# Patient Record
Sex: FEMALE | Race: WHITE | NOT HISPANIC OR LATINO | URBAN - METROPOLITAN AREA
[De-identification: names, ages, dates, MRNs, and addresses within clinical notes are randomized per-mention and may not be internally consistent; named-entity substitution may affect disease eponyms.]

---

## 2018-06-12 NOTE — PATIENT DISCUSSION
Pt had a change in the right eye. We printed out the new Rx and they Pt will have the right eye prescription remade.   DEANGELO

## 2018-11-02 ENCOUNTER — OUTPATIENT (OUTPATIENT)
Dept: OUTPATIENT SERVICES | Facility: HOSPITAL | Age: 63
LOS: 1 days | End: 2018-11-02
Payer: COMMERCIAL

## 2018-11-02 PROCEDURE — 76700 US EXAM ABDOM COMPLETE: CPT | Mod: 26

## 2018-11-02 PROCEDURE — 78226 HEPATOBILIARY SYSTEM IMAGING: CPT | Mod: 26

## 2018-12-11 ENCOUNTER — OUTPATIENT (OUTPATIENT)
Dept: OUTPATIENT SERVICES | Facility: HOSPITAL | Age: 63
LOS: 1 days | End: 2018-12-11

## 2018-12-27 ENCOUNTER — OUTPATIENT (OUTPATIENT)
Dept: OUTPATIENT SERVICES | Facility: HOSPITAL | Age: 63
LOS: 1 days | End: 2018-12-27

## 2019-04-17 PROBLEM — Z00.00 ENCOUNTER FOR PREVENTIVE HEALTH EXAMINATION: Status: ACTIVE | Noted: 2019-04-17

## 2020-01-08 NOTE — PATIENT DISCUSSION
ADVISED PT STOP OTC VISINE COMPLETELY. AND BEGIN LUMIFY. ADVISED HER IT WOULD TAKE ABOUT A MONTH TO SEE CHANGES.

## 2020-06-22 ENCOUNTER — APPOINTMENT (OUTPATIENT)
Dept: MAMMOGRAPHY | Facility: CLINIC | Age: 65
End: 2020-06-22
Payer: MEDICARE

## 2020-06-22 PROCEDURE — 77067 SCR MAMMO BI INCL CAD: CPT

## 2020-06-22 PROCEDURE — 77063 BREAST TOMOSYNTHESIS BI: CPT

## 2020-07-10 ENCOUNTER — APPOINTMENT (OUTPATIENT)
Dept: POPULATION HEALTH | Facility: CLINIC | Age: 65
End: 2020-07-10
Payer: MEDICARE

## 2020-07-10 DIAGNOSIS — M32.9 SYSTEMIC LUPUS ERYTHEMATOSUS, UNSPECIFIED: ICD-10-CM

## 2020-07-10 DIAGNOSIS — M06.9 RHEUMATOID ARTHRITIS, UNSPECIFIED: ICD-10-CM

## 2020-07-10 DIAGNOSIS — E78.5 HYPERLIPIDEMIA, UNSPECIFIED: ICD-10-CM

## 2020-07-10 DIAGNOSIS — Z87.891 PERSONAL HISTORY OF NICOTINE DEPENDENCE: ICD-10-CM

## 2020-07-10 DIAGNOSIS — Z77.098 CONTACT WITH AND (SUSPECTED) EXPOSURE TO OTHER HAZARDOUS, CHIEFLY NONMEDICINAL, CHEMICALS: ICD-10-CM

## 2020-07-10 DIAGNOSIS — Z84.0 FAMILY HISTORY OF DISEASES OF THE SKIN AND SUBCUTANEOUS TISSUE: ICD-10-CM

## 2020-07-10 DIAGNOSIS — E03.9 HYPOTHYROIDISM, UNSPECIFIED: ICD-10-CM

## 2020-07-10 DIAGNOSIS — Z80.9 FAMILY HISTORY OF MALIGNANT NEOPLASM, UNSPECIFIED: ICD-10-CM

## 2020-07-10 DIAGNOSIS — Z80.51 FAMILY HISTORY OF MALIGNANT NEOPLASM OF KIDNEY: ICD-10-CM

## 2020-07-10 DIAGNOSIS — L40.50 ARTHROPATHIC PSORIASIS, UNSPECIFIED: ICD-10-CM

## 2020-07-10 PROCEDURE — 99204 OFFICE O/P NEW MOD 45 MIN: CPT | Mod: 95

## 2020-07-16 PROBLEM — Z77.098 EXPOSURE TO CHEMICAL POLLUTION: Status: ACTIVE | Noted: 2020-07-16

## 2020-07-16 PROBLEM — M06.9 RHEUMATOID ARTHRITIS: Status: ACTIVE | Noted: 2020-07-16

## 2020-07-16 PROBLEM — Z84.0 FAMILY HISTORY OF PSORIASIS: Status: ACTIVE | Noted: 2020-07-16

## 2020-07-16 PROBLEM — E78.5 HLD (HYPERLIPIDEMIA): Status: ACTIVE | Noted: 2020-07-16

## 2020-07-16 PROBLEM — Z80.51 FAMILY HISTORY OF MALIGNANT NEOPLASM OF KIDNEY: Status: ACTIVE | Noted: 2020-07-16

## 2020-07-16 PROBLEM — L40.50 PSORIATIC ARTHRITIS: Status: ACTIVE | Noted: 2020-07-16

## 2020-07-16 PROBLEM — Z87.891 FORMER SMOKER: Status: ACTIVE | Noted: 2020-07-16

## 2020-07-16 PROBLEM — Z80.9 FAMILY HISTORY OF MALIGNANT NEOPLASM: Status: ACTIVE | Noted: 2020-07-16

## 2020-07-16 PROBLEM — E03.9 HYPOTHYROIDISM, UNSPECIFIED TYPE: Status: ACTIVE | Noted: 2020-07-16

## 2020-07-16 PROBLEM — M32.9 SYSTEMIC LUPUS ERYTHEMATOSUS (SLE) IN ADULT: Status: ACTIVE | Noted: 2020-07-16

## 2020-07-16 RX ORDER — ATORVASTATIN CALCIUM 20 MG/1
20 TABLET, FILM COATED ORAL
Refills: 0 | Status: ACTIVE | COMMUNITY

## 2020-07-16 RX ORDER — LEVOTHYROXINE SODIUM 112 MCG
112 TABLET ORAL
Refills: 0 | Status: ACTIVE | COMMUNITY

## 2020-07-16 RX ORDER — HYDROXYCHLOROQUINE SULFATE 200 MG/1
200 TABLET, FILM COATED ORAL
Refills: 0 | Status: ACTIVE | COMMUNITY

## 2020-07-16 RX ORDER — LISINOPRIL 20 MG/1
20 TABLET ORAL
Refills: 0 | Status: ACTIVE | COMMUNITY

## 2020-07-16 RX ORDER — ACETAMINOPHEN 325 MG/1
TABLET, FILM COATED ORAL
Refills: 0 | Status: ACTIVE | COMMUNITY

## 2020-07-16 RX ORDER — CHROMIUM 200 MCG
TABLET ORAL
Refills: 0 | Status: ACTIVE | COMMUNITY

## 2020-07-16 RX ORDER — UBIDECARENONE 100 MG
100 CAPSULE ORAL
Refills: 0 | Status: ACTIVE | COMMUNITY

## 2020-07-16 NOTE — HISTORY OF PRESENT ILLNESS
[FreeTextEntry1] : 65 year old woman here for evaluation of exposure to PFAS chemicals in [facility],.\par \par born in [facility] but moved as an infant to [place], [place] until age 12.  then moved back to [facility] from age 12 until about 31 ().  In  she moved to [place] where she rented a house for 1 year and then moved to a different house in [place] in the same development where she lived for 9 more years.  [place]  Water in the house was supplied by municipal supply.  Drank, bathed, and cooked with the tap water.  \par \par In early 40s she left [place] and moved back to [place] and rented a mobile home, around , where she has lived until the present time. 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 switched company several years later to a different title company in [place], where she still works.\par \par Hobbies:\par bowling\par camping\par beach\par \par PMHx:\par psoriatic arthritis, childhood psoriasis started at around 12\par Diagnosed with rheumatoid arthritis in .  had been on enbrel but got osteomyelitis so stopped. has tried other biologics but had adverse effects.\par SLE diagnosed in .\par hypothyroidism, doesn’t remember when diagnosed.\par HLD \par HTN\par dermatomyositis in 2017.  \par \par \par FHx:\par father's side family members - psoriasis.   in plane crash.\par mother - psoriasis, had a cousin with rheumatoid arthritis; had kidney cancer in around . \par cousin - SLE\par brother - cancer (lung??) \par brother - healthy\par son - healthy\par \par Soc:\par former smoker, quit in .\par

## 2020-07-16 NOTE — ASSESSMENT
[FreeTextEntry1] : Ms. Reilly is a 65 year old woman with autoimmune disease and hypothyroidism here for evaluation of exposure to PFAS chemicals while living in [place] from [id number].\par \par PFAS exposure has been established to result in increased risk of the following health effects:\par ~ Hepatocellular injury \par ~ Alterations in cholesterol metabolism \par ~ Alterations in thyroid function \par ~ Cancers-- with the kidney and the testes being best studied but evidence existing for other cancers, as well.\par ~ Alterations in androgen levels \par ~ Alterations of uric acid \par ~ Fertility and gestational issues \par ~ Alterations in antibody levels\par \par \par PFAS exposure, and its health effects, are further compounded by the fact that PFHxS has a half-life in the body is between five to 36 years, PFOS three to 27 years, while PFOA has a half-life of two to 10 years.\par \par \par Given the long half-life she is still at risk for the non-cancer outcomes and the elevated risk of related cancers will remain throughout her life.\par \par \par I had extensive discussion with pt about all of the above. All questions answered. I advised her that vigilance about her health, rather than anxiety or panic, was warranted. \par \par \par She understood and agreed with the plan. \par RTC 1 y unless clinical or exposure hx changes.\par \par \par

## 2020-07-16 NOTE — REASON FOR VISIT
[Medical Office: ([facility])___] : at the medical office located in  [Home] : at home, [unfilled] , at the time of the visit. [Verbal consent obtained from patient] : the patient, [unfilled] [Initial Evaluation] : an initial evaluation

## 2021-02-04 ENCOUNTER — IMPORTED ENCOUNTER (OUTPATIENT)
Dept: URBAN - METROPOLITAN AREA CLINIC 9 | Facility: CLINIC | Age: 66
End: 2021-02-04

## 2021-03-01 ENCOUNTER — IMPORTED ENCOUNTER (OUTPATIENT)
Dept: URBAN - METROPOLITAN AREA CLINIC 9 | Facility: CLINIC | Age: 66
End: 2021-03-01

## 2021-10-16 ASSESSMENT — PACHYMETRY
OD_CT_UM: 531.0
OS_CT_UM: 529.0

## 2021-10-16 ASSESSMENT — TONOMETRY
OD_IOP_MMHG: 13
OS_IOP_MMHG: 15

## 2021-10-16 ASSESSMENT — VISUAL ACUITY
OD_CC: 20/25 - SN
OS_CC: 20/40 - SN
OS_CC: 20/20 -2 SN
OS_CC: 20/30 -2 SN
OD_CC: 20/20 SN
OD_CC: 20/25 SN

## 2021-12-17 ENCOUNTER — ESTABLISHED PATIENT (OUTPATIENT)
Dept: URBAN - METROPOLITAN AREA CLINIC 4 | Facility: CLINIC | Age: 66
End: 2021-12-17

## 2021-12-17 DIAGNOSIS — H00.13: ICD-10-CM

## 2021-12-17 PROCEDURE — 92285 EXTERNAL OCULAR PHOTOGRAPHY: CPT

## 2021-12-17 PROCEDURE — 99213 OFFICE O/P EST LOW 20 MIN: CPT

## 2021-12-17 ASSESSMENT — VISUAL ACUITY
OD_CC: 20/20
OS_CC: 20/25

## 2021-12-17 ASSESSMENT — TONOMETRY
OS_IOP_MMHG: 13
OD_IOP_MMHG: 14

## 2022-06-24 ENCOUNTER — ESTABLISHED PATIENT (OUTPATIENT)
Dept: URBAN - METROPOLITAN AREA CLINIC 4 | Facility: CLINIC | Age: 67
End: 2022-06-24

## 2022-06-24 DIAGNOSIS — H25.13: ICD-10-CM

## 2022-06-24 DIAGNOSIS — Z79.899: ICD-10-CM

## 2022-06-24 DIAGNOSIS — H40.013: ICD-10-CM

## 2022-06-24 PROCEDURE — 92134 CPTRZ OPH DX IMG PST SGM RTA: CPT

## 2022-06-24 PROCEDURE — 99214 OFFICE O/P EST MOD 30 MIN: CPT

## 2022-06-24 PROCEDURE — 92020 GONIOSCOPY: CPT

## 2022-06-24 ASSESSMENT — TONOMETRY
OS_IOP_MMHG: 14
OD_IOP_MMHG: 15

## 2022-06-24 ASSESSMENT — VISUAL ACUITY
OD_GLARE: 20/40
OS_GLARE: 20/60

## 2022-08-22 ENCOUNTER — TECH ONLY (OUTPATIENT)
Dept: URBAN - METROPOLITAN AREA CLINIC 4 | Facility: CLINIC | Age: 67
End: 2022-08-22

## 2022-08-22 DIAGNOSIS — Z79.899: ICD-10-CM

## 2022-08-22 PROCEDURE — 92083 EXTENDED VISUAL FIELD XM: CPT

## 2022-12-06 ENCOUNTER — TECH ONLY (OUTPATIENT)
Dept: URBAN - METROPOLITAN AREA CLINIC 4 | Facility: CLINIC | Age: 67
End: 2022-12-06

## 2022-12-06 PROCEDURE — 92133 CPTRZD OPH DX IMG PST SGM ON: CPT

## 2022-12-06 PROCEDURE — 92083 EXTENDED VISUAL FIELD XM: CPT

## 2022-12-06 PROCEDURE — 99214 OFFICE O/P EST MOD 30 MIN: CPT

## 2022-12-06 ASSESSMENT — VISUAL ACUITY
OU_CC: 20/20-2
OD_CC: 20/20
OS_CC: 20/25-2

## 2022-12-06 ASSESSMENT — TONOMETRY
OD_IOP_MMHG: 17
OS_IOP_MMHG: 17

## 2023-09-18 ENCOUNTER — ESTABLISHED PATIENT (OUTPATIENT)
Dept: URBAN - METROPOLITAN AREA CLINIC 4 | Facility: CLINIC | Age: 68
End: 2023-09-18

## 2023-09-18 DIAGNOSIS — H25.13: ICD-10-CM

## 2023-09-18 DIAGNOSIS — M06.9: ICD-10-CM

## 2023-09-18 DIAGNOSIS — H40.013: ICD-10-CM

## 2023-09-18 DIAGNOSIS — H01.02B: ICD-10-CM

## 2023-09-18 DIAGNOSIS — H01.02A: ICD-10-CM

## 2023-09-18 DIAGNOSIS — Z79.620: ICD-10-CM

## 2023-09-18 DIAGNOSIS — H02.831: ICD-10-CM

## 2023-09-18 DIAGNOSIS — H02.834: ICD-10-CM

## 2023-09-18 PROCEDURE — 92134 CPTRZ OPH DX IMG PST SGM RTA: CPT

## 2023-09-18 PROCEDURE — 92014 COMPRE OPH EXAM EST PT 1/>: CPT

## 2023-09-18 ASSESSMENT — KERATOMETRY
OS_K1POWER_DIOPTERS: 43.50
OS_AXISANGLE_DEGREES: 024
OD_AXISANGLE2_DEGREES: 93
OS_AXISANGLE2_DEGREES: 114
OD_AXISANGLE_DEGREES: 003
OD_K2POWER_DIOPTERS: 44.25
OD_K1POWER_DIOPTERS: 43.25
OS_K2POWER_DIOPTERS: 44.00

## 2023-09-18 ASSESSMENT — VISUAL ACUITY
OD_CC: J1+
OU_CC: J1+
OS_CC: 20/40-1
OD_CC: 20/30
OS_CC: J1

## 2023-09-18 ASSESSMENT — TONOMETRY
OD_IOP_MMHG: 13
OS_IOP_MMHG: 13

## 2024-04-17 ENCOUNTER — CLINIC PROCEDURE ONLY (OUTPATIENT)
Facility: LOCATION | Age: 69
End: 2024-04-17

## 2024-04-17 DIAGNOSIS — H40.013: ICD-10-CM

## 2024-04-17 PROCEDURE — 92083 EXTENDED VISUAL FIELD XM: CPT

## 2024-04-17 PROCEDURE — 92133 CPTRZD OPH DX IMG PST SGM ON: CPT

## 2024-04-17 ASSESSMENT — KERATOMETRY
OD_K2POWER_DIOPTERS: 44.25
OS_K2POWER_DIOPTERS: 44.00
OS_AXISANGLE2_DEGREES: 114
OD_AXISANGLE2_DEGREES: 93
OS_K1POWER_DIOPTERS: 43.50
OD_AXISANGLE_DEGREES: 003
OS_AXISANGLE_DEGREES: 024
OD_K1POWER_DIOPTERS: 43.25

## 2024-07-22 ENCOUNTER — COMPREHENSIVE EXAM (OUTPATIENT)
Facility: LOCATION | Age: 69
End: 2024-07-22

## 2024-07-22 DIAGNOSIS — H01.02A: ICD-10-CM

## 2024-07-22 DIAGNOSIS — H01.02B: ICD-10-CM

## 2024-07-22 DIAGNOSIS — H02.834: ICD-10-CM

## 2024-07-22 DIAGNOSIS — H25.13: ICD-10-CM

## 2024-07-22 DIAGNOSIS — H02.831: ICD-10-CM

## 2024-07-22 DIAGNOSIS — Z79.899: ICD-10-CM

## 2024-07-22 DIAGNOSIS — H40.013: ICD-10-CM

## 2024-07-22 PROCEDURE — 92014 COMPRE OPH EXAM EST PT 1/>: CPT

## 2024-07-22 PROCEDURE — 92134 CPTRZ OPH DX IMG PST SGM RTA: CPT

## 2024-07-22 PROCEDURE — 92015 DETERMINE REFRACTIVE STATE: CPT

## 2024-07-22 ASSESSMENT — KERATOMETRY
OD_K1POWER_DIOPTERS: 43.25
OS_K1POWER_DIOPTERS: 43.50
OD_AXISANGLE_DEGREES: 003
OS_AXISANGLE2_DEGREES: 114
OS_K2POWER_DIOPTERS: 44.00
OS_AXISANGLE_DEGREES: 024
OD_K2POWER_DIOPTERS: 44.25
OD_AXISANGLE2_DEGREES: 93

## 2024-07-22 ASSESSMENT — VISUAL ACUITY
OS_GLARE: 20/100
OS_CC: 20/60
OD_CC: 20/60
OD_GLARE: 20/80
OU_CC: 20/60

## 2024-07-22 ASSESSMENT — TONOMETRY
OD_IOP_MMHG: 14
OS_IOP_MMHG: 14

## 2024-08-12 ENCOUNTER — PRE-OP/H&P (OUTPATIENT)
Facility: LOCATION | Age: 69
End: 2024-08-12

## 2024-08-12 DIAGNOSIS — H25.13: ICD-10-CM

## 2024-08-12 PROCEDURE — 92136 OPHTHALMIC BIOMETRY: CPT

## 2024-08-12 PROCEDURE — 92012 INTRM OPH EXAM EST PATIENT: CPT

## 2024-10-01 ENCOUNTER — SURGERY/PROCEDURE (OUTPATIENT)
Dept: URBAN - METROPOLITAN AREA SURGERY 6 | Facility: SURGERY | Age: 69
End: 2024-10-01

## 2024-10-01 DIAGNOSIS — H25.13: ICD-10-CM

## 2024-10-01 PROCEDURE — 66984 XCAPSL CTRC RMVL W/O ECP: CPT

## 2024-10-02 ENCOUNTER — POST-OP (OUTPATIENT)
Facility: LOCATION | Age: 69
End: 2024-10-02

## 2024-10-02 DIAGNOSIS — Z96.1: ICD-10-CM

## 2024-10-02 PROCEDURE — 99024 POSTOP FOLLOW-UP VISIT: CPT

## 2024-10-21 ENCOUNTER — POST-OP (OUTPATIENT)
Facility: LOCATION | Age: 69
End: 2024-10-21

## 2024-10-21 DIAGNOSIS — H25.11: ICD-10-CM

## 2024-10-21 DIAGNOSIS — Z96.1: ICD-10-CM

## 2024-11-05 ENCOUNTER — SURGERY/PROCEDURE (OUTPATIENT)
Dept: URBAN - METROPOLITAN AREA SURGERY 6 | Facility: SURGERY | Age: 69
End: 2024-11-05

## 2024-11-05 DIAGNOSIS — H25.13: ICD-10-CM

## 2024-11-05 PROCEDURE — 66984 XCAPSL CTRC RMVL W/O ECP: CPT | Mod: 79,RT

## 2024-11-06 ENCOUNTER — POST-OP (OUTPATIENT)
Facility: LOCATION | Age: 69
End: 2024-11-06

## 2024-11-06 DIAGNOSIS — Z96.1: ICD-10-CM

## 2024-11-06 PROCEDURE — 99024 POSTOP FOLLOW-UP VISIT: CPT

## 2024-11-21 ENCOUNTER — POST-OP (OUTPATIENT)
Dept: URBAN - METROPOLITAN AREA CLINIC 18 | Facility: CLINIC | Age: 69
End: 2024-11-21

## 2024-11-21 DIAGNOSIS — Z96.1: ICD-10-CM

## 2024-11-21 PROCEDURE — 99024 POSTOP FOLLOW-UP VISIT: CPT
